# Patient Record
Sex: MALE | Race: WHITE | Employment: UNEMPLOYED | URBAN - METROPOLITAN AREA
[De-identification: names, ages, dates, MRNs, and addresses within clinical notes are randomized per-mention and may not be internally consistent; named-entity substitution may affect disease eponyms.]

---

## 2024-09-26 ENCOUNTER — APPOINTMENT (OUTPATIENT)
Dept: PRIMARY CARE | Facility: CLINIC | Age: 44
End: 2024-09-26
Payer: MEDICAID

## 2024-09-27 ENCOUNTER — APPOINTMENT (OUTPATIENT)
Dept: PRIMARY CARE | Facility: CLINIC | Age: 44
End: 2024-09-27
Payer: MEDICAID

## 2024-10-25 ENCOUNTER — APPOINTMENT (OUTPATIENT)
Dept: PRIMARY CARE | Facility: CLINIC | Age: 44
End: 2024-10-25
Payer: MEDICAID

## 2024-10-25 VITALS
SYSTOLIC BLOOD PRESSURE: 128 MMHG | DIASTOLIC BLOOD PRESSURE: 78 MMHG | HEIGHT: 70 IN | TEMPERATURE: 97.2 F | BODY MASS INDEX: 29.78 KG/M2 | WEIGHT: 208 LBS

## 2024-10-25 DIAGNOSIS — L05.91 PILONIDAL CYST: ICD-10-CM

## 2024-10-25 DIAGNOSIS — Z00.00 ROUTINE ADULT HEALTH MAINTENANCE: Primary | ICD-10-CM

## 2024-10-25 LAB
ALBUMIN SERPL BCP-MCNC: 4.2 G/DL (ref 3.4–5)
ALP SERPL-CCNC: 71 U/L (ref 45–117)
ALT SERPL W P-5'-P-CCNC: 34 U/L (ref 14–59)
ANION GAP SERPL CALC-SCNC: 14 MMOL/L (ref 10–20)
AST SERPL W P-5'-P-CCNC: 18 U/L (ref 15–37)
BASOPHILS # BLD AUTO: 0.02 X10*3/UL (ref 0.1–1.6)
BASOPHILS NFR BLD AUTO: 0.26 % (ref 0–0.3)
BILIRUB SERPL-MCNC: 1.1 MG/DL (ref 0.2–1)
BUN SERPL-MCNC: 16 MG/DL (ref 7–18)
CALCIUM SERPL-MCNC: 9.5 MG/DL (ref 8.5–10.1)
CHLORIDE SERPL-SCNC: 99 MMOL/L (ref 98–107)
CHOLEST SERPL-MCNC: 200 MG/DL (ref 0–199)
CHOLESTEROL/HDL RATIO: 3.2 (ref 4.2–7)
CO2 SERPL-SCNC: 30 MMOL/L (ref 21–32)
CREAT SERPL-MCNC: 1.07 MG/DL (ref 0.6–1.1)
EGFRCR SERPLBLD CKD-EPI 2021: 87 ML/MIN/1.73M*2
EOSINOPHIL # BLD AUTO: 0.09 X10*3/UL (ref 0.04–0.5)
EOSINOPHIL NFR BLD AUTO: 1.43 % (ref 0.7–7)
ERYTHROCYTE [DISTWIDTH] IN BLOOD BY AUTOMATED COUNT: 14.2 % (ref 11.5–14.5)
GLUCOSE SERPL-MCNC: 88 MG/DL (ref 74–100)
HCT VFR BLD AUTO: 45.3 % (ref 38.4–51.3)
HDLC SERPL-MCNC: 63 MG/DL (ref 40–59)
HGB BLD-MCNC: 15.55 G/DL (ref 12.7–17)
IS PATIENT FASTING: NO
LDLC SERPL DIRECT ASSAY-MCNC: 120 MG/DL (ref 0–100)
LYMPHOCYTES # BLD AUTO: 1.83 X10*3/UL (ref 0–6)
LYMPHOCYTES NFR BLD AUTO: 30.34 % (ref 20.5–51.1)
MCH RBC QN AUTO: 29.9 PG (ref 26–32)
MCHC RBC AUTO-ENTMCNC: 34.3 G/DL (ref 31–38)
MCV RBC AUTO: 87.1 FL (ref 80–96)
MONOCYTES # BLD AUTO: 0.63 X10*3/UL (ref 1.6–24.9)
MONOCYTES NFR BLD AUTO: 10.5 % (ref 1.7–9.3)
NEUTROPHILS # BLD AUTO: 3.46 X10*3/UL (ref 1.4–6.5)
NEUTROPHILS NFR BLD AUTO: 57.47 % (ref 42.2–75.2)
PLATELET # BLD AUTO: 276.8 X10*3/UL (ref 150–450)
PMV BLD AUTO: 8.08 FL (ref 7.8–11)
POTASSIUM SERPL-SCNC: 4.6 MMOL/L (ref 3.5–5.1)
PROT SERPL-MCNC: 7.4 G/DL (ref 6.4–8.2)
RBC # BLD AUTO: 5.2 X10*6/UL (ref 4.1–5.6)
SODIUM SERPL-SCNC: 138 MMOL/L (ref 136–145)
TRIGL SERPL-MCNC: 57 MG/DL
WBC # BLD AUTO: 6.02 X10*3/UL (ref 4.5–10.5)

## 2024-10-25 PROCEDURE — 3008F BODY MASS INDEX DOCD: CPT | Performed by: PHYSICIAN ASSISTANT

## 2024-10-25 PROCEDURE — 80053 COMPREHEN METABOLIC PANEL: CPT | Performed by: PHYSICIAN ASSISTANT

## 2024-10-25 PROCEDURE — 99386 PREV VISIT NEW AGE 40-64: CPT | Performed by: PHYSICIAN ASSISTANT

## 2024-10-25 PROCEDURE — 1036F TOBACCO NON-USER: CPT | Performed by: PHYSICIAN ASSISTANT

## 2024-10-25 PROCEDURE — 85025 COMPLETE CBC W/AUTO DIFF WBC: CPT

## 2024-10-25 ASSESSMENT — ENCOUNTER SYMPTOMS
SHORTNESS OF BREATH: 0
DIZZINESS: 0
EYE PAIN: 0
PALPITATIONS: 0
CHOKING: 0
FACIAL SWELLING: 0
FREQUENCY: 0
DIFFICULTY URINATING: 0
VOMITING: 0
DIARRHEA: 0
COLOR CHANGE: 0
HEADACHES: 0
CHILLS: 0
COUGH: 0
POLYPHAGIA: 0
ABDOMINAL PAIN: 0
SLEEP DISTURBANCE: 0
FEVER: 0
SORE THROAT: 0
ABDOMINAL DISTENTION: 0
CONFUSION: 0
NERVOUS/ANXIOUS: 0
POLYDIPSIA: 0
NAUSEA: 0
WHEEZING: 0
ARTHRALGIAS: 0
HEMATURIA: 0
EYE DISCHARGE: 0
NUMBNESS: 0
JOINT SWELLING: 0
CHEST TIGHTNESS: 0
MYALGIAS: 0
APPETITE CHANGE: 0
TREMORS: 0
FATIGUE: 0
ANAL BLEEDING: 0
CONSTIPATION: 0
WEAKNESS: 0

## 2024-10-25 NOTE — PROGRESS NOTES
Subjective   Patient ID: Andrea Lopez is a 44 y.o. male with history of gastritis and pilonidal cyst who presents for New Patient Visit.    HPI patient is presented to become established as a new patient with a primary care provider.  He moved to the  from [facility] couple of months ago.  He has a history of gastritis which is well-controlled.  Stated he was diagnosed about 10 years ago.  He is not on any medications.    Also has pilonidal cyst which occasionally gets inflamed.  He had a surgery in April in [facility] [place].  Interested in colorectal surgery referral.  Stated there is no discharge or pain at this moment.    Otherwise he feels healthy.  He does not take any medications or supplements.  Does not smoke or drink alcohol.    Review of Systems   Constitutional:  Negative for appetite change, chills, fatigue and fever.   HENT:  Negative for congestion, ear pain, facial swelling, hearing loss, nosebleeds and sore throat.    Eyes:  Negative for pain, discharge and visual disturbance.   Respiratory:  Negative for cough, choking, chest tightness, shortness of breath and wheezing.    Cardiovascular:  Negative for chest pain, palpitations and leg swelling.   Gastrointestinal:  Negative for abdominal distention, abdominal pain, anal bleeding, constipation, diarrhea, nausea and vomiting.   Endocrine: Negative for cold intolerance, heat intolerance, polydipsia, polyphagia and polyuria.   Genitourinary:  Negative for difficulty urinating, frequency, hematuria and urgency.   Musculoskeletal:  Negative for arthralgias, gait problem, joint swelling and myalgias.   Skin:  Negative for color change and rash.   Neurological:  Negative for dizziness, tremors, syncope, weakness, numbness and headaches.   Psychiatric/Behavioral:  Negative for behavioral problems, confusion, sleep disturbance and suicidal ideas. The patient is not nervous/anxious.        Objective   /78 (Patient Position: Sitting)   Temp 36.2 °C (97.2  "°F) (Temporal)   Ht 1.772 m (5' 9.75\")   Wt 94.3 kg (208 lb)   BMI 30.06 kg/m²     Physical Exam  Constitutional:       General: He is not in acute distress.     Appearance: Normal appearance.   HENT:      Head: Normocephalic and atraumatic.      Nose: Nose normal.   Eyes:      Extraocular Movements: Extraocular movements intact.      Conjunctiva/sclera: Conjunctivae normal.      Pupils: Pupils are equal, round, and reactive to light.   Cardiovascular:      Rate and Rhythm: Normal rate and regular rhythm.      Pulses: Normal pulses.      Heart sounds: Normal heart sounds.   Pulmonary:      Effort: Pulmonary effort is normal.      Breath sounds: Normal breath sounds.   Abdominal:      General: Bowel sounds are normal.      Palpations: Abdomen is soft.   Musculoskeletal:         General: Normal range of motion.      Cervical back: Normal range of motion and neck supple.   Neurological:      General: No focal deficit present.      Mental Status: He is alert and oriented to person, place, and time.   Psychiatric:         Mood and Affect: Mood normal.         Behavior: Behavior normal.         Thought Content: Thought content normal.         Judgment: Judgment normal.         Assessment/Plan     Complete physical exam.  We obtained fasting blood work including CBC, CMP, lipid panel.  Exercise at least 30 minutes daily  Healthy diet recommended  Follow up with dentist twice a year for dental cleaning     History of gastritis  Stable    Pilonidal cyst  S/p surgery in April 2024 in [facility] [place]  Consult colorectal surgery, referral was given    I will call with blood test results    Follow-up annually or as needed.       "

## 2024-10-28 ENCOUNTER — OFFICE VISIT (OUTPATIENT)
Dept: SURGERY | Facility: CLINIC | Age: 44
End: 2024-10-28
Payer: MEDICAID

## 2024-10-28 VITALS
SYSTOLIC BLOOD PRESSURE: 131 MMHG | WEIGHT: 210 LBS | TEMPERATURE: 96 F | HEIGHT: 71 IN | BODY MASS INDEX: 29.4 KG/M2 | DIASTOLIC BLOOD PRESSURE: 79 MMHG | HEART RATE: 66 BPM

## 2024-10-28 DIAGNOSIS — K60.30 ANAL FISTULA: Primary | ICD-10-CM

## 2024-10-28 DIAGNOSIS — L05.91 PILONIDAL CYST: ICD-10-CM

## 2024-10-28 PROCEDURE — 46600 DIAGNOSTIC ANOSCOPY SPX: CPT | Performed by: NURSE PRACTITIONER

## 2024-10-28 PROCEDURE — 1036F TOBACCO NON-USER: CPT | Performed by: NURSE PRACTITIONER

## 2024-10-28 PROCEDURE — 99203 OFFICE O/P NEW LOW 30 MIN: CPT | Performed by: NURSE PRACTITIONER

## 2024-10-28 PROCEDURE — 3008F BODY MASS INDEX DOCD: CPT | Performed by: NURSE PRACTITIONER

## 2024-10-28 PROCEDURE — 99213 OFFICE O/P EST LOW 20 MIN: CPT | Performed by: NURSE PRACTITIONER

## 2024-10-28 ASSESSMENT — PAIN SCALES - GENERAL: PAINLEVEL_OUTOF10: 0-NO PAIN

## 2024-11-11 ENCOUNTER — HOSPITAL ENCOUNTER (OUTPATIENT)
Dept: RADIOLOGY | Facility: HOSPITAL | Age: 44
Discharge: HOME | End: 2024-11-11
Payer: COMMERCIAL

## 2024-11-11 DIAGNOSIS — K60.30 ANAL FISTULA: ICD-10-CM

## 2024-11-11 PROCEDURE — A9575 INJ GADOTERATE MEGLUMI 0.1ML: HCPCS | Performed by: NURSE PRACTITIONER

## 2024-11-11 PROCEDURE — 72197 MRI PELVIS W/O & W/DYE: CPT | Performed by: STUDENT IN AN ORGANIZED HEALTH CARE EDUCATION/TRAINING PROGRAM

## 2024-11-11 PROCEDURE — 72197 MRI PELVIS W/O & W/DYE: CPT

## 2024-11-11 PROCEDURE — 2550000001 HC RX 255 CONTRASTS: Performed by: NURSE PRACTITIONER

## 2024-11-11 RX ORDER — GADOTERATE MEGLUMINE 376.9 MG/ML
19 INJECTION INTRAVENOUS
Status: COMPLETED | OUTPATIENT
Start: 2024-11-11 | End: 2024-11-11

## 2024-11-15 ENCOUNTER — OFFICE VISIT (OUTPATIENT)
Dept: SURGERY | Facility: CLINIC | Age: 44
End: 2024-11-15
Payer: COMMERCIAL

## 2024-11-15 VITALS
HEART RATE: 64 BPM | BODY MASS INDEX: 29.12 KG/M2 | DIASTOLIC BLOOD PRESSURE: 84 MMHG | WEIGHT: 208 LBS | SYSTOLIC BLOOD PRESSURE: 126 MMHG

## 2024-11-15 DIAGNOSIS — K60.30 ANAL FISTULA: Primary | ICD-10-CM

## 2024-11-15 PROCEDURE — 99212 OFFICE O/P EST SF 10 MIN: CPT | Performed by: NURSE PRACTITIONER

## 2024-11-15 NOTE — H&P (VIEW-ONLY)
No chief complaint on file.      History Of Present Illness  Andrea Lopez is a 44 y.o. male presenting with pilonidal cyst and perianal fistula.    Subjective   Andrea Lopez was referred by Camryn Xiong CNP  for evaluation of pilonidal cyst. He had a surgery for his pilonidal in the [place] about 6 months ago. About 1 momnth ago he noticed pus coming from the area. He was seen by Camryn 10/28/24 and had a probable perianal fistula.     [employer]  used for today visit  #[id number]. He reports that the area was treated with a laser and left open to heal but it did not heal. He is having intermittent purulent drainage. He moves his bowels once daily in the morning. No urgency or incontinence.     MRI pelvis 11/12/24:   Intersphincteric perianal fistula starting at 6 o'clock position  coursing left posteroinferior terminating in the midline with tiny fluid collection measuring 0.5 cm    Colonoscopy:     Past Medical History  No past medical history on file.    Surgical History  No past surgical history on file.     Social History  He reports that he has never smoked. He has never used smokeless tobacco. He reports that he does not drink alcohol and does not use drugs.    Family History  Family History   Problem Relation Name Age of Onset    Hypertension Mother      Migraines Mother          Allergies  Patient has no known allergies.    Home Medications  Prior to Admission medications    Not on File       Review of Systems     Physical Exam    Perineal/Rectal Exam  Perineum: R posterolateral external opening just at intersphincteric groove  KELVIN: WNL, bulky sphincter  Tone: WNL     Last Recorded Vitals  There were no vitals taken for this visit.    Relevant Results          Assessment/Plan     The anatomy, pathophysiology and natural history of abscesses and fistulas were discussed with the patient.  We reviewed treatment approaches, procedures, options, preparation, perioperative course,  recovery, healing, risks including but not limited to bleeding, recurrence and incontinence, and possible outcomes in detail.     The final decision as to which procedure will be performed will depend on the operative findings including complexity, level through the sphincter, etc - primary fistulotomy, staged fistulotomy with a seton,  advancement flap repair, LIFT, etc. All questions were answered. Informed consent was obtained.

## 2024-11-15 NOTE — PROGRESS NOTES
No chief complaint on file.      History Of Present Illness  Andrea Lopez is a 44 y.o. male presenting with pilonidal cyst and perianal fistula.    Subjective   Andrea Lopez was referred by Camryn Xiong CNP  for evaluation of pilonidal cyst. He had a surgery for his pilonidal in the [place] about 6 months ago. About 1 momnth ago he noticed pus coming from the area. He was seen by Camryn 10/28/24 and had a probable perianal fistula.     [employer]  used for today visit  #[id number]. He reports that the area was treated with a laser and left open to heal but it did not heal. He is having intermittent purulent drainage. He moves his bowels once daily in the morning. No urgency or incontinence.     MRI pelvis 11/12/24:   Intersphincteric perianal fistula starting at 6 o'clock position  coursing left posteroinferior terminating in the midline with tiny fluid collection measuring 0.5 cm    Colonoscopy:     Past Medical History  No past medical history on file.    Surgical History  No past surgical history on file.     Social History  He reports that he has never smoked. He has never used smokeless tobacco. He reports that he does not drink alcohol and does not use drugs.    Family History  Family History   Problem Relation Name Age of Onset    Hypertension Mother      Migraines Mother          Allergies  Patient has no known allergies.    Home Medications  Prior to Admission medications    Not on File       Review of Systems     Physical Exam    Perineal/Rectal Exam  Perineum: R posterolateral external opening just at intersphincteric groove  KELVIN: WNL, bulky sphincter  Tone: WNL     Last Recorded Vitals  There were no vitals taken for this visit.    Relevant Results          Assessment/Plan     The anatomy, pathophysiology and natural history of abscesses and fistulas were discussed with the patient.  We reviewed treatment approaches, procedures, options, preparation, perioperative course,  recovery, healing, risks including but not limited to bleeding, recurrence and incontinence, and possible outcomes in detail.     The final decision as to which procedure will be performed will depend on the operative findings including complexity, level through the sphincter, etc - primary fistulotomy, staged fistulotomy with a seton,  advancement flap repair, LIFT, etc. All questions were answered. Informed consent was obtained.

## 2024-11-15 NOTE — PROGRESS NOTES
History Of Present Illness  Andrea Lopez is a 44 y.o. male presenting with a follow up from his MRI for a perianal abscess probable fistula.    MRI 11/12/24  IMPRESSION:  1. Intersphincteric perianal fistula starting at 6 o'clock position  coursing left posteroinferior terminating in the midline with tiny  fluid collection measuring 0.5 cm    No c/o any anal pain today.  No c/o any fever/chills.  He is still draining from the wound     Past Medical History  He has no past medical history on file.    Surgical History  He has no past surgical history on file.     Social History  He reports that he has never smoked. He has never used smokeless tobacco. He reports that he does not drink alcohol and does not use drugs.    Family History  Family History   Problem Relation Name Age of Onset    Hypertension Mother      Migraines Mother          Allergies  Patient has no known allergies.    Review of Systems   All other systems reviewed and are negative.       Physical Exam  Constitutional:       Appearance: Normal appearance.   HENT:      Head: Normocephalic and atraumatic.   Pulmonary:      Effort: Pulmonary effort is normal.   Musculoskeletal:         General: Normal range of motion.   Neurological:      General: No focal deficit present.      Mental Status: He is alert and oriented to person, place, and time.   Psychiatric:         Mood and Affect: Mood normal.         Behavior: Behavior normal.          Last Recorded Vitals  Wt 94.3 kg (208 lb)        Assessment/Plan   Andrea has a perianal fistula.  He will schedule to see one of our colorectal surgeons in the near future for surgery.  We talked about the different surgeries that could be done and he understands.  He will call with any issues and will follow up in post-op.       Camryn Xiong, APRN-CNP

## 2024-11-19 ENCOUNTER — OFFICE VISIT (OUTPATIENT)
Dept: SURGERY | Facility: CLINIC | Age: 44
End: 2024-11-19
Payer: COMMERCIAL

## 2024-11-19 ENCOUNTER — PREP FOR PROCEDURE (OUTPATIENT)
Dept: SURGERY | Facility: CLINIC | Age: 44
End: 2024-11-19
Payer: COMMERCIAL

## 2024-11-19 VITALS
BODY MASS INDEX: 29.4 KG/M2 | WEIGHT: 210 LBS | DIASTOLIC BLOOD PRESSURE: 76 MMHG | HEART RATE: 98 BPM | SYSTOLIC BLOOD PRESSURE: 108 MMHG

## 2024-11-19 DIAGNOSIS — K60.30 ANAL FISTULA: Primary | ICD-10-CM

## 2024-11-19 PROCEDURE — 99214 OFFICE O/P EST MOD 30 MIN: CPT | Performed by: SURGERY

## 2024-12-04 ENCOUNTER — ANESTHESIA (OUTPATIENT)
Dept: OPERATING ROOM | Facility: HOSPITAL | Age: 44
End: 2024-12-04
Payer: COMMERCIAL

## 2024-12-04 ENCOUNTER — HOSPITAL ENCOUNTER (OUTPATIENT)
Facility: HOSPITAL | Age: 44
Setting detail: OUTPATIENT SURGERY
Discharge: HOME | End: 2024-12-04
Attending: SURGERY | Admitting: SURGERY
Payer: COMMERCIAL

## 2024-12-04 ENCOUNTER — ANESTHESIA EVENT (OUTPATIENT)
Dept: OPERATING ROOM | Facility: HOSPITAL | Age: 44
End: 2024-12-04
Payer: COMMERCIAL

## 2024-12-04 VITALS
OXYGEN SATURATION: 99 % | BODY MASS INDEX: 29.57 KG/M2 | HEART RATE: 73 BPM | SYSTOLIC BLOOD PRESSURE: 130 MMHG | WEIGHT: 211.2 LBS | TEMPERATURE: 97.2 F | RESPIRATION RATE: 11 BRPM | HEIGHT: 71 IN | DIASTOLIC BLOOD PRESSURE: 87 MMHG

## 2024-12-04 DIAGNOSIS — K60.30 ANAL FISTULA: Primary | ICD-10-CM

## 2024-12-04 PROCEDURE — 2720000007 HC OR 272 NO HCPCS: Performed by: SURGERY

## 2024-12-04 PROCEDURE — 3600000003 HC OR TIME - INITIAL BASE CHARGE - PROCEDURE LEVEL THREE: Performed by: SURGERY

## 2024-12-04 PROCEDURE — A46280 PR REMOVAL ANAL FISTULA,TRANS/SUPRA/EXTRAPHINCT, +/-SETON: Performed by: NURSE ANESTHETIST, CERTIFIED REGISTERED

## 2024-12-04 PROCEDURE — A46280 PR REMOVAL ANAL FISTULA,TRANS/SUPRA/EXTRAPHINCT, +/-SETON: Performed by: ANESTHESIOLOGY

## 2024-12-04 PROCEDURE — 7100000002 HC RECOVERY ROOM TIME - EACH INCREMENTAL 1 MINUTE: Performed by: SURGERY

## 2024-12-04 PROCEDURE — 2500000004 HC RX 250 GENERAL PHARMACY W/ HCPCS (ALT 636 FOR OP/ED): Mod: SE | Performed by: NURSE ANESTHETIST, CERTIFIED REGISTERED

## 2024-12-04 PROCEDURE — 96372 THER/PROPH/DIAG INJ SC/IM: CPT | Performed by: NURSE ANESTHETIST, CERTIFIED REGISTERED

## 2024-12-04 PROCEDURE — 2500000004 HC RX 250 GENERAL PHARMACY W/ HCPCS (ALT 636 FOR OP/ED): Mod: SE | Performed by: SURGERY

## 2024-12-04 PROCEDURE — 3600000008 HC OR TIME - EACH INCREMENTAL 1 MINUTE - PROCEDURE LEVEL THREE: Performed by: SURGERY

## 2024-12-04 PROCEDURE — 3700000002 HC GENERAL ANESTHESIA TIME - EACH INCREMENTAL 1 MINUTE: Performed by: SURGERY

## 2024-12-04 PROCEDURE — 7100000010 HC PHASE TWO TIME - EACH INCREMENTAL 1 MINUTE: Performed by: SURGERY

## 2024-12-04 PROCEDURE — 2500000004 HC RX 250 GENERAL PHARMACY W/ HCPCS (ALT 636 FOR OP/ED): Mod: SE | Performed by: ANESTHESIOLOGY

## 2024-12-04 PROCEDURE — 3600000007 HC OR TIME - EACH INCREMENTAL 1 MINUTE - PROCEDURE LEVEL TWO: Performed by: SURGERY

## 2024-12-04 PROCEDURE — 3600000002 HC OR TIME - INITIAL BASE CHARGE - PROCEDURE LEVEL TWO: Performed by: SURGERY

## 2024-12-04 PROCEDURE — 2500000005 HC RX 250 GENERAL PHARMACY W/O HCPCS: Mod: SE | Performed by: NURSE ANESTHETIST, CERTIFIED REGISTERED

## 2024-12-04 PROCEDURE — 7100000001 HC RECOVERY ROOM TIME - INITIAL BASE CHARGE: Performed by: SURGERY

## 2024-12-04 PROCEDURE — 2500000005 HC RX 250 GENERAL PHARMACY W/O HCPCS: Mod: SE | Performed by: SURGERY

## 2024-12-04 PROCEDURE — 3700000001 HC GENERAL ANESTHESIA TIME - INITIAL BASE CHARGE: Performed by: SURGERY

## 2024-12-04 PROCEDURE — 7100000009 HC PHASE TWO TIME - INITIAL BASE CHARGE: Performed by: SURGERY

## 2024-12-04 PROCEDURE — 46275 REMOVE ANAL FIST INTER: CPT | Performed by: SURGERY

## 2024-12-04 RX ORDER — SODIUM CHLORIDE, SODIUM LACTATE, POTASSIUM CHLORIDE, CALCIUM CHLORIDE 600; 310; 30; 20 MG/100ML; MG/100ML; MG/100ML; MG/100ML
50 INJECTION, SOLUTION INTRAVENOUS CONTINUOUS
Status: DISCONTINUED | OUTPATIENT
Start: 2024-12-04 | End: 2024-12-04 | Stop reason: HOSPADM

## 2024-12-04 RX ORDER — DROPERIDOL 2.5 MG/ML
INJECTION, SOLUTION INTRAMUSCULAR; INTRAVENOUS AS NEEDED
Status: DISCONTINUED | OUTPATIENT
Start: 2024-12-04 | End: 2024-12-04

## 2024-12-04 RX ORDER — LIDOCAINE HYDROCHLORIDE 20 MG/ML
INJECTION, SOLUTION INFILTRATION; PERINEURAL AS NEEDED
Status: DISCONTINUED | OUTPATIENT
Start: 2024-12-04 | End: 2024-12-04

## 2024-12-04 RX ORDER — ONDANSETRON HYDROCHLORIDE 2 MG/ML
INJECTION, SOLUTION INTRAVENOUS AS NEEDED
Status: DISCONTINUED | OUTPATIENT
Start: 2024-12-04 | End: 2024-12-04

## 2024-12-04 RX ORDER — MEPERIDINE HYDROCHLORIDE 25 MG/ML
12.5 INJECTION INTRAMUSCULAR; INTRAVENOUS; SUBCUTANEOUS EVERY 10 MIN PRN
Status: DISCONTINUED | OUTPATIENT
Start: 2024-12-04 | End: 2024-12-04 | Stop reason: HOSPADM

## 2024-12-04 RX ORDER — METHOCARBAMOL 100 MG/ML
1000 INJECTION, SOLUTION INTRAMUSCULAR; INTRAVENOUS ONCE
Status: DISCONTINUED | OUTPATIENT
Start: 2024-12-04 | End: 2024-12-04 | Stop reason: HOSPADM

## 2024-12-04 RX ORDER — HYDROMORPHONE HYDROCHLORIDE 0.2 MG/ML
0.5 INJECTION INTRAMUSCULAR; INTRAVENOUS; SUBCUTANEOUS EVERY 5 MIN PRN
Status: DISCONTINUED | OUTPATIENT
Start: 2024-12-04 | End: 2024-12-04 | Stop reason: HOSPADM

## 2024-12-04 RX ORDER — HYDROGEN PEROXIDE 3 %
SOLUTION, NON-ORAL MISCELLANEOUS AS NEEDED
Status: DISCONTINUED | OUTPATIENT
Start: 2024-12-04 | End: 2024-12-04 | Stop reason: HOSPADM

## 2024-12-04 RX ORDER — DROPERIDOL 2.5 MG/ML
0.62 INJECTION, SOLUTION INTRAMUSCULAR; INTRAVENOUS ONCE AS NEEDED
Status: DISCONTINUED | OUTPATIENT
Start: 2024-12-04 | End: 2024-12-04 | Stop reason: HOSPADM

## 2024-12-04 RX ORDER — LABETALOL HYDROCHLORIDE 5 MG/ML
5 INJECTION, SOLUTION INTRAVENOUS ONCE AS NEEDED
Status: DISCONTINUED | OUTPATIENT
Start: 2024-12-04 | End: 2024-12-04 | Stop reason: HOSPADM

## 2024-12-04 RX ORDER — OXYCODONE HYDROCHLORIDE 5 MG/1
5 TABLET ORAL EVERY 4 HOURS PRN
Status: DISCONTINUED | OUTPATIENT
Start: 2024-12-04 | End: 2024-12-04 | Stop reason: HOSPADM

## 2024-12-04 RX ORDER — HYDROMORPHONE HYDROCHLORIDE 0.2 MG/ML
0.2 INJECTION INTRAMUSCULAR; INTRAVENOUS; SUBCUTANEOUS EVERY 5 MIN PRN
Status: DISCONTINUED | OUTPATIENT
Start: 2024-12-04 | End: 2024-12-04 | Stop reason: HOSPADM

## 2024-12-04 RX ORDER — SODIUM CHLORIDE 0.9 G/100ML
IRRIGANT IRRIGATION AS NEEDED
Status: DISCONTINUED | OUTPATIENT
Start: 2024-12-04 | End: 2024-12-04 | Stop reason: HOSPADM

## 2024-12-04 RX ORDER — OXYCODONE HYDROCHLORIDE 5 MG/1
5 TABLET ORAL EVERY 6 HOURS PRN
Qty: 15 TABLET | Refills: 0 | Status: SHIPPED | OUTPATIENT
Start: 2024-12-04

## 2024-12-04 RX ORDER — FENTANYL CITRATE 50 UG/ML
INJECTION, SOLUTION INTRAMUSCULAR; INTRAVENOUS AS NEEDED
Status: DISCONTINUED | OUTPATIENT
Start: 2024-12-04 | End: 2024-12-04

## 2024-12-04 RX ORDER — MIDAZOLAM HYDROCHLORIDE 1 MG/ML
INJECTION INTRAMUSCULAR; INTRAVENOUS AS NEEDED
Status: DISCONTINUED | OUTPATIENT
Start: 2024-12-04 | End: 2024-12-04

## 2024-12-04 RX ORDER — BUPIVACAINE HCL/EPINEPHRINE 0.25-.0005
VIAL (ML) INJECTION AS NEEDED
Status: DISCONTINUED | OUTPATIENT
Start: 2024-12-04 | End: 2024-12-04 | Stop reason: HOSPADM

## 2024-12-04 RX ORDER — ALBUTEROL SULFATE 0.83 MG/ML
2.5 SOLUTION RESPIRATORY (INHALATION) ONCE AS NEEDED
Status: DISCONTINUED | OUTPATIENT
Start: 2024-12-04 | End: 2024-12-04 | Stop reason: HOSPADM

## 2024-12-04 RX ORDER — LIDOCAINE HYDROCHLORIDE 10 MG/ML
0.1 INJECTION, SOLUTION EPIDURAL; INFILTRATION; INTRACAUDAL; PERINEURAL ONCE
Status: DISCONTINUED | OUTPATIENT
Start: 2024-12-04 | End: 2024-12-04 | Stop reason: HOSPADM

## 2024-12-04 RX ORDER — PROPOFOL 10 MG/ML
INJECTION, EMULSION INTRAVENOUS AS NEEDED
Status: DISCONTINUED | OUTPATIENT
Start: 2024-12-04 | End: 2024-12-04

## 2024-12-04 RX ORDER — POVIDONE-IODINE 10 %
SOLUTION, NON-ORAL TOPICAL AS NEEDED
Status: DISCONTINUED | OUTPATIENT
Start: 2024-12-04 | End: 2024-12-04 | Stop reason: HOSPADM

## 2024-12-04 RX ORDER — ROCURONIUM BROMIDE 10 MG/ML
INJECTION, SOLUTION INTRAVENOUS AS NEEDED
Status: DISCONTINUED | OUTPATIENT
Start: 2024-12-04 | End: 2024-12-04

## 2024-12-04 RX ORDER — MIDAZOLAM HYDROCHLORIDE 1 MG/ML
1 INJECTION INTRAMUSCULAR; INTRAVENOUS ONCE AS NEEDED
Status: DISCONTINUED | OUTPATIENT
Start: 2024-12-04 | End: 2024-12-04 | Stop reason: HOSPADM

## 2024-12-04 ASSESSMENT — COLUMBIA-SUICIDE SEVERITY RATING SCALE - C-SSRS
1. IN THE PAST MONTH, HAVE YOU WISHED YOU WERE DEAD OR WISHED YOU COULD GO TO SLEEP AND NOT WAKE UP?: NO
6. HAVE YOU EVER DONE ANYTHING, STARTED TO DO ANYTHING, OR PREPARED TO DO ANYTHING TO END YOUR LIFE?: NO
2. HAVE YOU ACTUALLY HAD ANY THOUGHTS OF KILLING YOURSELF?: NO

## 2024-12-04 ASSESSMENT — PAIN - FUNCTIONAL ASSESSMENT
PAIN_FUNCTIONAL_ASSESSMENT: 0-10

## 2024-12-04 ASSESSMENT — PAIN SCALES - GENERAL
PAINLEVEL_OUTOF10: 0 - NO PAIN
PAIN_LEVEL: 2
PAINLEVEL_OUTOF10: 0 - NO PAIN
PAINLEVEL_OUTOF10: 0 - NO PAIN

## 2024-12-04 NOTE — OP NOTE
Repair Fistula Anus Operative Note     Date: 2024  OR Location: [facility] OR    Name: Andrea Lopez, : 1980, Age: 44 y.o., MRN: [id number], Sex: male    Diagnosis  Pre-op Diagnosis      * Anal fistula [K60.30] Post-op Diagnosis     * Anal fistula [K60.30]     Procedures  Repair Fistula Anus  [id number] - HC TX ANAL FSTL TRANS/SUPRA/XTRASPHNCTRC INCL SETON      Surgeons      * Aretha Liu - Primary    Resident/Fellow/Other Assistant:  Surgeons and Role:  * No surgeons found with a matching role *    Staff:   Scrub Person: Jannet  Circulator: Christelle    Anesthesia Staff: Anesthesiologist: Sy Cooper MD  CRNA: MARGUERITE Monge-CRNA    Procedure Summary  Anesthesia: Anesthesia type not filed in the log.  ASA: ASA status not filed in the log.  Estimated Blood Loss: 2mL  Intra-op Medications:   Administrations occurring from 0755 to 0915 on 24:   Medication Name Total Dose   dexAMETHasone (Decadron) injection 4 mg/mL 8 mg   droperidol (Inapsine) injection 0.625 mg   fentaNYL (Sublimaze) injection 50 mcg/mL 100 mcg   ketamine injection 50 mg/ 5 mL (10 mg/mL) 30 mg   lidocaine (Xylocaine) injection 2 % 60 mg   midazolam PF (Versed) injection 1 mg/mL 2 mg   ondansetron (Zofran) 2 mg/mL injection 4 mg   propofol (Diprivan) injection 10 mg/mL 200 mg   rocuronium (ZeMuron) 50 mg/5 mL injection 50 mg   NaCl 0.9 % bolus Cannot be calculated   sugammadex (Bridion) 200 mg/2 mL injection 400 mg              Anesthesia Record               Intraprocedure I/O Totals       None           Specimen: No specimens collected              Drains and/or Catheters: * None in log *    Tourniquet Times:         Implants:     Findings: intersphincteric fistula involving <10% sphincter    Indications: Andrea Lopez is an 44 y.o. male who is having surgery for Anal fistula [K60.30].     The patient was seen in the preoperative area. The risks, benefits, complications, treatment options,  non-operative alternatives, expected recovery and outcomes were discussed with the patient. The possibilities of reaction to medication, pulmonary aspiration, injury to surrounding structures, bleeding, recurrent infection, the need for additional procedures, failure to diagnose a condition, and creating a complication requiring transfusion or operation were discussed with the patient. The patient concurred with the proposed plan, giving informed consent.  The site of surgery was properly noted/marked if necessary per policy. The patient has been actively warmed in preoperative area. Preoperative antibiotics are not indicated. Venous thrombosis prophylaxis have been ordered including bilateral sequential compression devices    Procedure Details:   Procedure:   Informed consent was obtained including discussion of risks, benefits, and alternatives. The patient was brought to the operating room and placed supine. Sequential compression devices were placed. Huddle was completed in accordance with hospital procedures. Anesthesia was induced and ETT was placed. The patient was placed in lithotomy with all pressure points padded. The area was prepped and draped in the usual fashion. Time out was completed.     Perineal and digital rectal exam were performed. Pudendal nerve block was performed with 1/4% marcaine with epinephrine. Intersphincteric block  was also performed. A total of 30cc of local anesthesia was utilized.  The exam was normal other than a chronic appearing opening in the interesphincteric groove to the left of the posterior midline. Anoscope was introduced. There was a slight abnormality below the dentate line corresponding to the same line as the external opening, just to left of midline. The remainder of the canal was normal. Probe easily passed from external to internal opening. It was involving <10% of the sphincter and probably <5% -- mostly subcutaneous. Fistulotomy was performed. Hemostasis was  assured.   The patient was then returned to supine. Anesthesia was weaned and the endotracheal tube was removed. The patient was transferred to PACU awake and in stable conditions. Counts were reported correct at the end of the procedure. I was present and scrubbed throughout.      Complications:  None; patient tolerated the procedure well.    Disposition: PACU - hemodynamically stable.  Condition: stable                 Additional Details: none    Attending Attestation: A qualified resident physician was not available.    Aretha Liu  Phone Number: [phone]

## 2024-12-04 NOTE — ANESTHESIA PREPROCEDURE EVALUATION
Patient: Andrea Lopez    Procedure Information       Anesthesia Start Date/Time: 12/04/24 0855    Procedure: Repair Fistula Anus    Location: [facility] OR 02 / Virtual [facility] OR    Surgeons: Aretha Liu MD            Relevant Problems   No relevant active problems       Clinical information reviewed:   Tobacco  Allergies  Meds   Med Hx  Surg Hx   Fam Hx  Soc Hx        NPO Detail:  NPO/Void Status  Carbohydrate Drink Given Prior to Surgery? : N  Date of Last Liquid: 12/04/24  Time of Last Liquid: 0000  Date of Last Solid: 12/03/24  Time of Last Solid: 1600  Last Intake Type: Clear fluids         Physical Exam    Airway  Mallampati: II     Cardiovascular - normal exam  Rhythm: regular  Rate: normal     Dental    Pulmonary - normal exam     Abdominal - normal exam         Anesthesia Plan    History of general anesthesia?: yes  History of complications of general anesthesia?: no    ASA 1     general     intravenous induction   Anesthetic plan and risks discussed with patient.    Plan discussed with CRNA.

## 2024-12-04 NOTE — ANESTHESIA POSTPROCEDURE EVALUATION
Patient: Andrea Lopez    Procedure Summary       Date: 12/04/24 Room / Location: [facility] OR 02 / Virtual [facility] OR    Anesthesia Start: 0855 Anesthesia Stop: 0932    Procedure: Fistulotomy Diagnosis:       Anal fistula      (Anal fistula [K60.30])    Surgeons: Aretha Liu MD Responsible Provider: Sy Cooper MD    Anesthesia Type: general ASA Status: 1            Anesthesia Type: general    Vitals Value Taken Time   /67 12/04/24 0945   Temp 36 °C (96.8 °F) 12/04/24 0931   Pulse 75 12/04/24 0956   Resp 8 12/04/24 0956   SpO2 97 % 12/04/24 0956   Vitals shown include unfiled device data.    Anesthesia Post Evaluation    Patient location during evaluation: PACU  Patient participation: complete - patient participated  Level of consciousness: awake and alert  Pain score: 2  Pain management: adequate  Airway patency: patent  Cardiovascular status: acceptable  Respiratory status: acceptable  Hydration status: acceptable  Postoperative Nausea and Vomiting: none    No notable events documented.

## 2024-12-04 NOTE — ANESTHESIA PROCEDURE NOTES
Airway  Date/Time: 12/4/2024 9:02 AM  Urgency: elective    Airway not difficult    Staffing  Performed: CRNA   Authorized by: Sy Cooper MD    Performed by: MARGUERITE Monge-JASON  Patient location during procedure: OR    Indications and Patient Condition  Indications for airway management: anesthesia and airway protection  Spontaneous Ventilation: absent  Sedation level: deep  Preoxygenated: yes  Patient position: sniffing  MILS maintained throughout  Mask difficulty assessment: 1 - vent by mask  Planned trial extubation    Final Airway Details  Final airway type: endotracheal airway      Successful airway: ETT  Cuffed: yes   Successful intubation technique: direct laryngoscopy  Facilitating devices/methods: intubating stylet  Endotracheal tube insertion site: oral  Blade: Holly  Blade size: #4  ETT size (mm): 7.0  Cormack-Lehane Classification: grade I - full view of glottis  Placement verified by: chest auscultation and capnometry   Cuff volume (mL): 3  Measured from: lips  ETT to lips (cm): 22  Number of attempts at approach: 1

## 2025-01-14 ENCOUNTER — OFFICE VISIT (OUTPATIENT)
Dept: PRIMARY CARE | Facility: CLINIC | Age: 45
End: 2025-01-14
Payer: COMMERCIAL

## 2025-01-14 ENCOUNTER — HOSPITAL ENCOUNTER (OUTPATIENT)
Dept: RADIOLOGY | Facility: CLINIC | Age: 45
Discharge: HOME | End: 2025-01-14
Payer: COMMERCIAL

## 2025-01-14 VITALS
TEMPERATURE: 97.4 F | DIASTOLIC BLOOD PRESSURE: 68 MMHG | BODY MASS INDEX: 30.92 KG/M2 | HEIGHT: 70 IN | WEIGHT: 216 LBS | SYSTOLIC BLOOD PRESSURE: 104 MMHG

## 2025-01-14 DIAGNOSIS — M54.42 ACUTE LEFT-SIDED LOW BACK PAIN WITH LEFT-SIDED SCIATICA: ICD-10-CM

## 2025-01-14 DIAGNOSIS — M54.42 ACUTE LEFT-SIDED LOW BACK PAIN WITH LEFT-SIDED SCIATICA: Primary | ICD-10-CM

## 2025-01-14 PROCEDURE — 72100 X-RAY EXAM L-S SPINE 2/3 VWS: CPT | Performed by: RADIOLOGY

## 2025-01-14 PROCEDURE — 99214 OFFICE O/P EST MOD 30 MIN: CPT | Performed by: PHYSICIAN ASSISTANT

## 2025-01-14 PROCEDURE — 1036F TOBACCO NON-USER: CPT | Performed by: PHYSICIAN ASSISTANT

## 2025-01-14 PROCEDURE — 3008F BODY MASS INDEX DOCD: CPT | Performed by: PHYSICIAN ASSISTANT

## 2025-01-14 PROCEDURE — 72100 X-RAY EXAM L-S SPINE 2/3 VWS: CPT

## 2025-01-14 RX ORDER — PREDNISONE 20 MG/1
TABLET ORAL
Qty: 18 TABLET | Refills: 0 | Status: SHIPPED | OUTPATIENT
Start: 2025-01-14

## 2025-01-14 ASSESSMENT — ENCOUNTER SYMPTOMS
FREQUENCY: 0
EYE DISCHARGE: 0
POLYPHAGIA: 0
CHEST TIGHTNESS: 0
EYE PAIN: 0
POLYDIPSIA: 0
CHILLS: 0
MYALGIAS: 0
COUGH: 0
APPETITE CHANGE: 0
ABDOMINAL DISTENTION: 0
DEPRESSION: 0
OCCASIONAL FEELINGS OF UNSTEADINESS: 0
FEVER: 0
LOSS OF SENSATION IN FEET: 0
CONFUSION: 0
ANAL BLEEDING: 0
DIFFICULTY URINATING: 0
WEAKNESS: 0
JOINT SWELLING: 0
SHORTNESS OF BREATH: 0
CHOKING: 0
SORE THROAT: 0
HEADACHES: 0
DIARRHEA: 0
BACK PAIN: 1
NAUSEA: 0
COLOR CHANGE: 0
HEMATURIA: 0
ARTHRALGIAS: 0
WHEEZING: 0
VOMITING: 0
ABDOMINAL PAIN: 0
NUMBNESS: 0
FATIGUE: 0
DIZZINESS: 0
NERVOUS/ANXIOUS: 0
SLEEP DISTURBANCE: 0
CONSTIPATION: 0
PALPITATIONS: 0
TREMORS: 0
FACIAL SWELLING: 0

## 2025-01-14 ASSESSMENT — PAIN SCALES - GENERAL: PAINLEVEL_OUTOF10: 4

## 2025-01-14 ASSESSMENT — COLUMBIA-SUICIDE SEVERITY RATING SCALE - C-SSRS
1. IN THE PAST MONTH, HAVE YOU WISHED YOU WERE DEAD OR WISHED YOU COULD GO TO SLEEP AND NOT WAKE UP?: NO
2. HAVE YOU ACTUALLY HAD ANY THOUGHTS OF KILLING YOURSELF?: NO
6. HAVE YOU EVER DONE ANYTHING, STARTED TO DO ANYTHING, OR PREPARED TO DO ANYTHING TO END YOUR LIFE?: NO

## 2025-01-14 ASSESSMENT — PATIENT HEALTH QUESTIONNAIRE - PHQ9
2. FEELING DOWN, DEPRESSED OR HOPELESS: NOT AT ALL
1. LITTLE INTEREST OR PLEASURE IN DOING THINGS: NOT AT ALL
SUM OF ALL RESPONSES TO PHQ9 QUESTIONS 1 AND 2: 0

## 2025-01-14 NOTE — PROGRESS NOTES
"Subjective   Patient ID: Andrea Lopez is a 44 y.o. male with history of gastritis and pilonidal cyst who presents for Back Pain (Onset:  approx. 2 weeks).    HPI patient is presented with complaints of low back pain which he developed 2-3 weeks ago. The pain gets worse with laying and squatting. It radiates to the left leg. He denies tingling or numbness of the extremities.  Stated he had low back pain in the past but it has not been bothering him for almost a year.    Review of Systems   Constitutional:  Negative for appetite change, chills, fatigue and fever.   HENT:  Negative for congestion, ear pain, facial swelling, hearing loss, nosebleeds and sore throat.    Eyes:  Negative for pain, discharge and visual disturbance.   Respiratory:  Negative for cough, choking, chest tightness, shortness of breath and wheezing.    Cardiovascular:  Negative for chest pain, palpitations and leg swelling.   Gastrointestinal:  Negative for abdominal distention, abdominal pain, anal bleeding, constipation, diarrhea, nausea and vomiting.   Endocrine: Negative for cold intolerance, heat intolerance, polydipsia, polyphagia and polyuria.   Genitourinary:  Negative for difficulty urinating, frequency, hematuria and urgency.   Musculoskeletal:  Positive for back pain. Negative for arthralgias, gait problem, joint swelling and myalgias.   Skin:  Negative for color change and rash.   Neurological:  Negative for dizziness, tremors, syncope, weakness, numbness and headaches.   Psychiatric/Behavioral:  Negative for behavioral problems, confusion, sleep disturbance and suicidal ideas. The patient is not nervous/anxious.        Objective   /68 (Patient Position: Sitting)   Temp 36.3 °C (97.4 °F) (Temporal)   Ht 1.778 m (5' 10\")   Wt 98 kg (216 lb)   BMI 30.99 kg/m²     Physical Exam  Constitutional:       General: He is not in acute distress.     Appearance: Normal appearance.   HENT:      Head: Normocephalic and atraumatic.      " Nose: Nose normal.   Eyes:      Extraocular Movements: Extraocular movements intact.      Conjunctiva/sclera: Conjunctivae normal.      Pupils: Pupils are equal, round, and reactive to light.   Cardiovascular:      Rate and Rhythm: Normal rate and regular rhythm.      Pulses: Normal pulses.      Heart sounds: Normal heart sounds.   Pulmonary:      Effort: Pulmonary effort is normal.      Breath sounds: Normal breath sounds.   Abdominal:      General: Bowel sounds are normal.      Palpations: Abdomen is soft.   Musculoskeletal:         General: Normal range of motion.      Cervical back: Normal range of motion and neck supple.   Neurological:      General: No focal deficit present.      Mental Status: He is alert and oriented to person, place, and time.   Psychiatric:         Mood and Affect: Mood normal.         Behavior: Behavior normal.         Thought Content: Thought content normal.         Judgment: Judgment normal.         Assessment/Plan   Low back pain   Will obtain xray lumbar spine, requisition is given  Start prednisone 60 mg taper  Apply heating pads  Apply topical creams such as Voltaren or Biofreeze  Take Tylenol extra strength 3 times a day as needed for pain  Do not lift anything heavy    History of gastritis  Stable    Pilonidal cyst  S/p surgery in April 2024 in [facility] [place]  Consult colorectal surgery, referral was given    Follow-up annually or as needed.

## (undated) DEVICE — MANIFOLD, 4 PORT NEPTUNE STANDARD

## (undated) DEVICE — DRAPE, LEGGINGS, 48 X 31 IN, STERILE, LF

## (undated) DEVICE — SUTURE, SOFSILK* 0  BLACK 12 X 18 PRE-CUT"

## (undated) DEVICE — DRESSING, ABDOMINAL PAD, CURITY, 8 X 10 IN

## (undated) DEVICE — Device

## (undated) DEVICE — BRIEF, CURITY, XLARGE, MESH

## (undated) DEVICE — DRESSING, NON-ADHERENT, TELFA, OUCHLESS, 3 X 8 IN, STERILE

## (undated) DEVICE — TIP, SUCTION, YANKAUER, FLEXIBLE

## (undated) DEVICE — COVER, CART, 45 X 27 X 48 IN, CLEAR